# Patient Record
Sex: FEMALE | Race: BLACK OR AFRICAN AMERICAN | Employment: UNEMPLOYED | URBAN - NONMETROPOLITAN AREA
[De-identification: names, ages, dates, MRNs, and addresses within clinical notes are randomized per-mention and may not be internally consistent; named-entity substitution may affect disease eponyms.]

---

## 2020-12-17 ENCOUNTER — HOSPITAL ENCOUNTER (EMERGENCY)
Age: 20
Discharge: HOME | End: 2020-12-17
Attending: EMERGENCY MEDICINE
Payer: MEDICAID

## 2020-12-17 VITALS
DIASTOLIC BLOOD PRESSURE: 51 MMHG | BODY MASS INDEX: 20.83 KG/M2 | RESPIRATION RATE: 20 BRPM | OXYGEN SATURATION: 98 % | HEART RATE: 88 BPM | WEIGHT: 125 LBS | SYSTOLIC BLOOD PRESSURE: 101 MMHG | TEMPERATURE: 99.1 F | HEIGHT: 65 IN

## 2020-12-17 DIAGNOSIS — H18.822 CORNEAL ABRASION DUE TO CONTACT LENS, LEFT: ICD-10-CM

## 2020-12-17 DIAGNOSIS — Z20.2 POSSIBLE EXPOSURE TO STD: Primary | ICD-10-CM

## 2020-12-17 LAB
CLUE CELLS VAG QL WET PREP: NORMAL
T VAGINALIS VAG QL WET PREP: NORMAL

## 2020-12-17 PROCEDURE — 96372 THER/PROPH/DIAG INJ SC/IM: CPT

## 2020-12-17 PROCEDURE — 74011250637 HC RX REV CODE- 250/637: Performed by: EMERGENCY MEDICINE

## 2020-12-17 PROCEDURE — 74011250636 HC RX REV CODE- 250/636: Performed by: EMERGENCY MEDICINE

## 2020-12-17 PROCEDURE — 74011000250 HC RX REV CODE- 250: Performed by: EMERGENCY MEDICINE

## 2020-12-17 PROCEDURE — 99284 EMERGENCY DEPT VISIT MOD MDM: CPT

## 2020-12-17 PROCEDURE — 87491 CHLMYD TRACH DNA AMP PROBE: CPT

## 2020-12-17 PROCEDURE — 87210 SMEAR WET MOUNT SALINE/INK: CPT

## 2020-12-17 RX ORDER — TETRACAINE HYDROCHLORIDE 5 MG/ML
2 SOLUTION OPHTHALMIC
Status: COMPLETED | OUTPATIENT
Start: 2020-12-17 | End: 2020-12-17

## 2020-12-17 RX ORDER — LEVOFLOXACIN 5 MG/ML
1 SOLUTION/ DROPS TOPICAL EVERY 4 HOURS
Qty: 5 ML | Refills: 0 | Status: SHIPPED | OUTPATIENT
Start: 2020-12-17 | End: 2020-12-22

## 2020-12-17 RX ORDER — AZITHROMYCIN 250 MG/1
1000 TABLET, FILM COATED ORAL
Status: COMPLETED | OUTPATIENT
Start: 2020-12-17 | End: 2020-12-17

## 2020-12-17 RX ORDER — FLUCONAZOLE 150 MG/1
150 TABLET ORAL DAILY
Qty: 1 TAB | Refills: 0 | Status: SHIPPED | OUTPATIENT
Start: 2020-12-17 | End: 2020-12-18

## 2020-12-17 RX ORDER — METRONIDAZOLE 250 MG/1
2000 TABLET ORAL
Status: COMPLETED | OUTPATIENT
Start: 2020-12-17 | End: 2020-12-17

## 2020-12-17 RX ADMIN — FLUORESCEIN SODIUM 1 STRIP: 1 STRIP OPHTHALMIC at 04:50

## 2020-12-17 RX ADMIN — TETRACAINE HYDROCHLORIDE 2 DROP: 5 SOLUTION OPHTHALMIC at 04:25

## 2020-12-17 RX ADMIN — METRONIDAZOLE 2000 MG: 250 TABLET ORAL at 05:09

## 2020-12-17 RX ADMIN — AZITHROMYCIN MONOHYDRATE 1000 MG: 250 TABLET ORAL at 05:09

## 2020-12-17 RX ADMIN — LIDOCAINE HYDROCHLORIDE 250 MG: 10 INJECTION, SOLUTION INFILTRATION; PERINEURAL at 05:09

## 2020-12-17 NOTE — ED TRIAGE NOTES
Attempted to removed contact lens, left eye 12/16/2020 during the day, unsuccessful.   C/o Vaginal discomfort, labia major \" feels like cults\"

## 2020-12-17 NOTE — ED NOTES
Discharge to home [facility]. Written and verbal discharge instructions given. x2 prescriptions electronically sent to patients Pharmacy of choice. Allowed time for questions and verbal return demonstration.

## 2020-12-17 NOTE — ED PROVIDER NOTES
EMERGENCY DEPARTMENT HISTORY AND PHYSICAL EXAM      Date: 12/17/2020  Patient Name: Amalia Aguilar    History of Presenting Illness     Chief Complaint   Patient presents with    Contact Lens Irritation     left eye, unable to remove contact    Vaginal Pain     bilateral labia majora       History Provided By: Patient    HPI: Amalia Aguilar, 22 y.o. female with a past medical history significant No significant past medical history presents to the ED with cc of left eye irritation feeling as if she has her contact lens stuck in her eye as well as several days of vaginal irritation and burning with urination as well as clear discharge. No fevers no chills no abdominal pain. Patient states she put her contacts\" yesterday afternoon for picture was having trouble getting them out of both eyes but she was able to remove the one in the right 1 on the left she feels has been stuck in there persistently. There are no other complaints, changes, or physical findings at this time. PCP: Daniel Ware MD    No current facility-administered medications on file prior to encounter. No current outpatient medications on file prior to encounter. Past History     Past Medical History:  No past medical history on file. Past Surgical History:  No past surgical history on file. Family History:  No family history on file. Social History:  Social History     Tobacco Use    Smoking status: Not on file   Substance Use Topics    Alcohol use: Not on file    Drug use: Not on file       Allergies:  No Known Allergies      Review of Systems     Review of Systems   Constitutional: Positive for activity change. HENT: Negative for congestion. Eyes: Positive for pain and redness. Negative for photophobia, discharge and visual disturbance. Gastrointestinal: Negative for abdominal pain, diarrhea, nausea and vomiting. Genitourinary: Positive for dysuria, frequency and vaginal discharge.  Negative for vaginal bleeding and vaginal pain. Physical Exam     Physical Exam  Vitals signs and nursing note reviewed. Exam conducted with a chaperone present. Constitutional:       General: She is not in acute distress. Appearance: Normal appearance. She is not ill-appearing. HENT:      Head: Normocephalic and atraumatic. Right Ear: External ear normal.      Left Ear: External ear normal.      Nose: Nose normal.      Mouth/Throat:      Mouth: Mucous membranes are moist.   Eyes:      General:         Right eye: No discharge. Left eye: No discharge. Extraocular Movements: Extraocular movements intact. Pupils: Pupils are equal, round, and reactive to light. Comments: The left conjunctiva is injected. No obvious foreign body or contact lens identified on exam.  Eyelids were everted and examined. The eye was examined under Woods lamp with fluorescein and tetracaine. There is a small superficial corneal abrasion on the 4:00 to 3 o'clock position on the cornea. Neck:      Musculoskeletal: Normal range of motion and neck supple. Cardiovascular:      Rate and Rhythm: Normal rate and regular rhythm. Pulmonary:      Effort: Pulmonary effort is normal.      Breath sounds: Normal breath sounds. Abdominal:      General: Abdomen is flat. Bowel sounds are normal.      Palpations: Abdomen is soft. Hernia: There is no hernia in the left inguinal area or right inguinal area. Genitourinary:     Exam position: Lithotomy position. Pubic Area: No rash or pubic lice. Tk stage (genital): 5. Labia:         Right: No rash. Left: No rash. Vagina: Vaginal discharge, erythema and tenderness present. No bleeding or lesions. Cervix: No cervical motion tenderness, discharge, erythema or cervical bleeding. Uterus: Normal.       Adnexa: Right adnexa normal and left adnexa normal.      Comments: Thin frothy white vaginal discharge.   Musculoskeletal: Normal range of motion. Lymphadenopathy:      Lower Body: No right inguinal adenopathy. No left inguinal adenopathy. Skin:     General: Skin is warm and dry. Capillary Refill: Capillary refill takes less than 2 seconds. Neurological:      General: No focal deficit present. Mental Status: She is alert. Psychiatric:         Mood and Affect: Mood normal.         Lab and Diagnostic Study Results     Labs -     Recent Results (from the past 12 hour(s))   WET PREP    Collection Time: 12/17/20  4:45 AM    Specimen: Vaginal Specimen   Result Value Ref Range    Clue cells No yeast,trichomonas or clue cells noted      Wet prep No yeast,trichomonas or clue cells noted         Radiologic Studies -   @lastxrresult@  CT Results  (Last 48 hours)    None        CXR Results  (Last 48 hours)    None            Medical Decision Making   - I am the first provider for this patient. - I reviewed the vital signs, available nursing notes, past medical history, past surgical history, family history and social history. - Initial assessment performed. The patients presenting problems have been discussed, and they are in agreement with the care plan formulated and outlined with them. I have encouraged them to ask questions as they arise throughout their visit. Vital Signs-Reviewed the patient's vital signs. Patient Vitals for the past 12 hrs:   Temp Pulse Resp BP SpO2   12/17/20 0407 99.1 °F (37.3 °C) 88 20 (!) 101/51 98 %       Records Reviewed: Nursing Notes        ED Course:          Provider Notes (Medical Decision Making):   26-year-old female presenting to the emergency department with 2 complaints I complaint and vaginal complaint. It appears patient has sustained a corneal abrasion likely in the process of trying to get out her contact out of her left eye.   Wound eye was irrigated with normal saline and anesthetized with tetracaine and stained with fluorescein we did not visualize the contact but there was a small corneal abrasion. Will treat with Levaquin eyedrops given that she wears contacts. In regards to her vaginal discharge potential vaginal yeast infection versus trichomonal infection versus gonorrhea chlamydia. To discussion with patient she wants to be treated will follow up on her labs at home on MyChart. [facility]           Disposition   Disposition:       Discharged    DISCHARGE PLAN:  1. Cannot display discharge medications since this patient is not currently admitted. 2.   Follow-up Information     Follow up With Specialties Details Why Contact Info    [facility] EMERGENCY DEPT Emergency Medicine Go to  As needed, or for any concerns or deteriorations. , if symptoms persist or worsen. [address] MD Pediatric Medicine Schedule an appointment as soon as possible for a visit  As needed, For followup and recheck of todays symptoms [address]  [phone]          3. Return to ED if worse   4. Discharge Medication List as of 12/17/2020  5:54 AM      START taking these medications    Details   levoFLOXacin (QUIXIN) 0.5 % ophthalmic solution Administer 1 Drop to left eye every four (4) hours for 5 days. use in affected eye(s), Normal, Disp-5 mL, R-0      fluconazole (DIFLUCAN) 150 mg tablet Take 1 Tab by mouth daily for 1 day. FDA advises cautious prescribing of oral fluconazole in pregnancy. , Normal, Disp-1 Tab, R-0               Diagnosis     Clinical Impression:   1. Possible exposure to STD    2. Corneal abrasion due to contact lens, left        Attestations:    Christian Pimentel MD    Please note that this dictation was completed with Glide, the computer voice recognition software. Quite often unanticipated grammatical, syntax, homophones, and other interpretive errors are inadvertently transcribed by the computer software. Please disregard these errors.   Please excuse any errors that have escaped final proofreading. Thank you.

## 2020-12-21 LAB
C TRACH RRNA SPEC QL NAA+PROBE: NEGATIVE
N GONORRHOEA RRNA SPEC QL NAA+PROBE: NEGATIVE
SPECIMEN SOURCE: NORMAL

## 2021-01-09 ENCOUNTER — APPOINTMENT (OUTPATIENT)
Dept: CT IMAGING | Age: 21
End: 2021-01-09
Attending: FAMILY MEDICINE
Payer: MEDICAID

## 2021-01-09 ENCOUNTER — HOSPITAL ENCOUNTER (EMERGENCY)
Age: 21
Discharge: ANOTHER HOSPITAL | End: 2021-01-09
Attending: FAMILY MEDICINE | Admitting: FAMILY MEDICINE
Payer: MEDICAID

## 2021-01-09 VITALS
TEMPERATURE: 98.2 F | SYSTOLIC BLOOD PRESSURE: 113 MMHG | HEIGHT: 65 IN | DIASTOLIC BLOOD PRESSURE: 61 MMHG | OXYGEN SATURATION: 99 % | RESPIRATION RATE: 16 BRPM | HEART RATE: 83 BPM | WEIGHT: 125 LBS | BODY MASS INDEX: 20.83 KG/M2

## 2021-01-09 DIAGNOSIS — N83.201 CYST OF RIGHT OVARY: ICD-10-CM

## 2021-01-09 DIAGNOSIS — R10.2 PELVIC PAIN: Primary | ICD-10-CM

## 2021-01-09 LAB
ANION GAP SERPL CALC-SCNC: 7 MMOL/L
BASOPHILS # BLD: 0 K/UL
BASOPHILS NFR BLD: 0 %
BUN SERPL-MCNC: 7 MG/DL (ref 9–21)
BUN/CREAT SERPL: 14
CA-I BLD-MCNC: 9.4 MG/DL (ref 8.5–10.5)
CHLORIDE SERPL-SCNC: 106 MMOL/L (ref 94–111)
CO2 SERPL-SCNC: 23 MMOL/L (ref 21–33)
CREAT SERPL-MCNC: 0.5 MG/DL (ref 0.7–1.2)
EOSINOPHIL # BLD: 0 K/UL
EOSINOPHIL NFR BLD: 0 %
ERYTHROCYTE [DISTWIDTH] IN BLOOD BY AUTOMATED COUNT: 19 % (ref 11.6–14.5)
GLUCOSE SERPL-MCNC: 90 MG/DL (ref 70–110)
HCT VFR BLD AUTO: 23.4 % (ref 35–45)
HGB BLD-MCNC: 8.2 G/DL (ref 12–16)
IMM GRANULOCYTES # BLD AUTO: 0 K/UL
IMM GRANULOCYTES NFR BLD AUTO: 0 %
LYMPHOCYTES # BLD: 3.2 K/UL
LYMPHOCYTES NFR BLD: 23 %
MCH RBC QN AUTO: 34.2 PG (ref 24–34)
MCHC RBC AUTO-ENTMCNC: 35 G/DL (ref 31–37)
MCV RBC AUTO: 97.5 FL (ref 74–97)
MONOCYTES # BLD: 1 K/UL
MONOCYTES NFR BLD: 7 %
NEUTS BAND NFR BLD MANUAL: 1 %
NEUTS SEG # BLD: 9.7 K/UL
NEUTS SEG NFR BLD: 69 %
NRBC # BLD: 0.28 K/UL
NRBC BLD MANUAL-RTO: 2 PER 100 WBC
PLATELET # BLD AUTO: 318 K/UL (ref 135–420)
PLATELET COMMENTS,PCOM: ABNORMAL
PMV BLD AUTO: 9.8 FL (ref 9.2–11.8)
POTASSIUM SERPL-SCNC: 4.5 MMOL/L (ref 3.2–5.1)
RBC # BLD AUTO: 2.4 M/UL (ref 4.2–5.3)
RBC MORPH BLD: ABNORMAL
SODIUM SERPL-SCNC: 136 MMOL/L (ref 135–145)
WBC # BLD AUTO: 13.8 K/UL (ref 4.6–13.2)

## 2021-01-09 PROCEDURE — 96374 THER/PROPH/DIAG INJ IV PUSH: CPT

## 2021-01-09 PROCEDURE — 99284 EMERGENCY DEPT VISIT MOD MDM: CPT

## 2021-01-09 PROCEDURE — 80048 BASIC METABOLIC PNL TOTAL CA: CPT

## 2021-01-09 PROCEDURE — 74176 CT ABD & PELVIS W/O CONTRAST: CPT

## 2021-01-09 PROCEDURE — 96375 TX/PRO/DX INJ NEW DRUG ADDON: CPT

## 2021-01-09 PROCEDURE — 85025 COMPLETE CBC W/AUTO DIFF WBC: CPT

## 2021-01-09 PROCEDURE — 74011250636 HC RX REV CODE- 250/636: Performed by: FAMILY MEDICINE

## 2021-01-09 PROCEDURE — 36415 COLL VENOUS BLD VENIPUNCTURE: CPT

## 2021-01-09 RX ORDER — HYDROXYUREA 1000 MG/1
1750 TABLET, FILM COATED ORAL DAILY
COMMUNITY
End: 2022-01-01

## 2021-01-09 RX ORDER — KETOROLAC TROMETHAMINE 30 MG/ML
30 INJECTION, SOLUTION INTRAMUSCULAR; INTRAVENOUS
Status: COMPLETED | OUTPATIENT
Start: 2021-01-09 | End: 2021-01-09

## 2021-01-09 RX ORDER — VOXELOTOR 500 MG/1
1500 TABLET, FILM COATED ORAL DAILY
COMMUNITY

## 2021-01-09 RX ORDER — ONDANSETRON 2 MG/ML
4 INJECTION INTRAMUSCULAR; INTRAVENOUS ONCE
Status: COMPLETED | OUTPATIENT
Start: 2021-01-09 | End: 2021-01-09

## 2021-01-09 RX ORDER — FOLIC ACID 1 MG/1
TABLET ORAL DAILY
COMMUNITY

## 2021-01-09 RX ADMIN — ONDANSETRON 4 MG: 2 INJECTION INTRAMUSCULAR; INTRAVENOUS at 17:45

## 2021-01-09 RX ADMIN — KETOROLAC TROMETHAMINE 30 MG: 30 INJECTION, SOLUTION INTRAMUSCULAR at 17:50

## 2021-01-09 RX ADMIN — SODIUM CHLORIDE 1000 ML: 9 INJECTION, SOLUTION INTRAVENOUS at 17:45

## 2021-01-09 NOTE — ED TRIAGE NOTES
Pt started with right side and suprapubic pain a couple of hours ago, states it hurts to pee.  Pt states she has sickle cell and she had a routine appt yesterday, states they checked blood and urine yesterday and everything was ok.  Pt states they were supposed to send her oxycodone in, but it was not called in to the pharmacy

## 2021-01-09 NOTE — ED PROVIDER NOTES
EMERGENCY DEPARTMENT HISTORY AND PHYSICAL EXAM      Date: 1/9/2021  Patient Name: Blair Bryan    History of Presenting Illness     Chief Complaint   Patient presents with    Urinary Pain       History Provided By: Patient    HPI: Blair Bryan, 23 y.o. female with a past medical history of sickle cell anemia presents to the ED with complaints of lower abdominal pain. Pt reports sudden onset of RLQ pain approx 1.5 hrs ago. Pain is described as sharp and has remained constant since onset. Pain radiated into the low back and is rated 8/10. Pt states she was hot, dizzy and diaphoretic at time of onset, but states these sx have since resolved. She does note burning w/urination, but denies fever, chills or any other sx. Pt had a routine hematology appt yesterday; blood work and urine were negative. LMP within the past month. There are no other complaints, changes, or physical findings at this time. PCP: Angela Webster MD    No current facility-administered medications on file prior to encounter. Current Outpatient Medications on File Prior to Encounter   Medication Sig Dispense Refill    folic acid (FOLVITE) 1 mg tablet Take  by mouth daily.  hydroxyurea, sickle cell, (Siklos) 1,000 mg tab Take  by mouth.  voxelotor (Oxbryta) 500 mg tab Take  by mouth. Past History     Past Medical History:  Past Medical History:   Diagnosis Date    Sickle cell anemia (HCC)        Past Surgical History:  Past Surgical History:   Procedure Laterality Date    HX APPENDECTOMY      HX CHOLECYSTECTOMY      HX TONSIL AND ADENOIDECTOMY         Family History:  History reviewed. No pertinent family history.     Social History:  Social History     Tobacco Use    Smoking status: Never Smoker    Smokeless tobacco: Never Used   Substance Use Topics    Alcohol use: Not on file    Drug use: Not on file       Allergies:  No Known Allergies      Review of Systems   Review of Systems Constitutional: Negative for chills, diaphoresis, fatigue and fever. HENT: Negative for congestion, rhinorrhea and sore throat. Respiratory: Negative for cough, shortness of breath and wheezing. Cardiovascular: Negative for chest pain and palpitations. Gastrointestinal: Positive for abdominal pain (RLQ). Negative for diarrhea, nausea and vomiting. Genitourinary: Positive for dysuria and pelvic pain. Negative for menstrual problem and vaginal bleeding. Musculoskeletal: Positive for back pain. Negative for arthralgias and myalgias. Neurological: Negative for dizziness, light-headedness and headaches. Physical Exam   Physical Exam  Vitals signs and nursing note reviewed. Constitutional:       General: She is awake. Appearance: Normal appearance. She is well-developed and normal weight. She is not ill-appearing, toxic-appearing or diaphoretic. Interventions: Face mask in place. Comments: In moderate pain/distress. HENT:      Head: Normocephalic and atraumatic. Eyes:      Extraocular Movements: Extraocular movements intact. Pupils: Pupils are equal, round, and reactive to light. Neck:      Musculoskeletal: Normal range of motion and neck supple. Cardiovascular:      Rate and Rhythm: Normal rate and regular rhythm. Pulses: Normal pulses. Heart sounds: Normal heart sounds. Pulmonary:      Effort: Pulmonary effort is normal.      Breath sounds: Normal breath sounds. Abdominal:      General: Abdomen is flat. Palpations: Abdomen is soft. Tenderness: There is abdominal tenderness in the right upper quadrant. Genitourinary:     Comments: Tenderness over right adnexa. Skin:     General: Skin is warm and dry. Neurological:      Mental Status: She is alert and oriented to person, place, and time. GCS: GCS eye subscore is 4. GCS verbal subscore is 5. GCS motor subscore is 6.    Psychiatric:         Mood and Affect: Mood and affect normal. Behavior: Behavior normal. Behavior is cooperative. Thought Content: Thought content normal.         Diagnostic Study Results     Labs -     Recent Results (from the past 12 hour(s))   CBC WITH AUTOMATED DIFF    Collection Time: 01/09/21  4:45 PM   Result Value Ref Range    WBC 13.8 (H) 4.6 - 13.2 K/uL    RBC 2.40 (L) 4.20 - 5.30 M/uL    HGB 8.2 (L) 12.0 - 16.0 g/dL    HCT 23.4 (L) 35.0 - 45.0 %    MCV 97.5 (H) 74.0 - 97.0 FL    MCH 34.2 (H) 24.0 - 34.0 PG    MCHC 35.0 31.0 - 37.0 g/dL    RDW 19.0 (H) 11.6 - 14.5 %    PLATELET 341 581 - 145 K/uL    MPV 9.8 9.2 - 11.8 FL    NEUTROPHILS 69 %    BAND NEUTROPHILS 1 %    LYMPHOCYTES 23 %    MONOCYTES 7 %    EOSINOPHILS 0 %    BASOPHILS 0 %    NRBC 2.0  WBC    IMMATURE GRANULOCYTES 0 %    ABS. NEUTROPHILS 9.7 K/UL    ABS. LYMPHOCYTES 3.2 K/UL    ABS. MONOCYTES 1.0 K/UL    ABS. EOSINOPHILS 0.0 K/UL    ABS. BASOPHILS 0.0 K/UL    ABSOLUTE NRBC 0.28 K/uL    ABS. IMM. GRANS. 0.0 K/UL    PLATELET COMMENTS Clumped Platelets      RBC COMMENTS Anisocytosis  4+        RBC COMMENTS Sickle cells  2+        RBC COMMENTS Target Cells  1+        RBC COMMENTS Polychromasia  2+       METABOLIC PANEL, BASIC    Collection Time: 01/09/21  4:45 PM   Result Value Ref Range    Sodium 136 135 - 145 mmol/L    Potassium 4.5 3.2 - 5.1 mmol/L    Chloride 106 94 - 111 mmol/L    CO2 23 21 - 33 mmol/L    Anion gap 7 mmol/L    Glucose 90 70 - 110 mg/dL    BUN 7 (L) 9 - 21 mg/dL    Creatinine 0.50 (L) 0.70 - 1.20 mg/dL    BUN/Creatinine ratio 14      GFR est AA >60 ml/min/1.73m2    GFR est non-AA >60 ml/min/1.73m2    Calcium 9.4 8.5 - 10.5 mg/dL       Radiologic Studies -   CT ABD PELV WO CONT   Final Result   IMPRESSION:      There is a 2.7 cm cystic structure in the right adnexa and there is high density   slightly complex fluid in the cul-de-sac concerning for a ruptured adnexal cyst.   Advise pelvic ultrasound for further evaluation to exclude torsion or other   adnexal pathology. 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 Atrophic calcified spleen suggesting prior infarction. Findings in keeping with known history of sickle cell disease. CT Results  (Last 48 hours)               01/09/21 1657  CT ABD PELV WO CONT Final result    Impression:  IMPRESSION:       There is a 2.7 cm cystic structure in the right adnexa and there is high density   slightly complex fluid in the cul-de-sac concerning for a ruptured adnexal cyst.   Advise pelvic ultrasound for further evaluation to exclude torsion or other   adnexal pathology. 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 Atrophic calcified spleen suggesting prior infarction. Findings in keeping with known history of sickle cell disease. Narrative:  EXAM: CT of the abdomen and pelvis       INDICATION: Right lower quadrant pain       COMPARISON: None. TECHNIQUE: Axial CT imaging of the abdomen and pelvis was performed without   intravenous contrast. Multiplanar reformats were generated. One or more dose   reduction techniques were used on this CT: automated exposure control,   adjustment of the mAs and/or kVp according to patient size, and iterative   reconstruction techniques. The specific techniques used on this CT exam have   been documented in the patient's electronic medical record. Digital Imaging and   Communications in Medicine (DICOM) format image data are available to   nonaffiliated external healthcare facilities or entities on a secure, media   free, reciprocally searchable basis with patient authorization for at least a   12-month period after this study. _______________       FINDINGS:       LOWER CHEST: There is bandlike scarring in both lung bases. LIVER, BILIARY: Liver is normal. No biliary dilation. Cholecystectomy       PANCREAS: Normal.       SPLEEN: Atrophic densely calcified spleen suggesting prior splenic infarction. ADRENALS: Normal.       KIDNEYS: Slightly prominent left extrarenal pelvis present otherwise   unremarkable. VASCULATURE: Unremarkable       LYMPH NODES: No enlarged lymph nodes. GASTROINTESTINAL TRACT: There is mild colonic wall thickening of the   rectosigmoid colon which may reflect under distention or mild colitis. Appendectomy. There is no bowel obstruction. PELVIC ORGANS: Uterus is unremarkable. Urinary bladder is within normal limits. There is a 2.7 cm right adnexal cystic structure. Adjacent free fluid is seen in   the right adnexa. There is some high density material in the cul-de-sac may   represent blood products. Findings concerning for ruptured cyst. Advise pelvic   ultrasound for further evaluation. BONES: There is advanced avascular necrosis left femoral head with collapse. Subtle early avascular necrosis seen in the right femoral head without collapse. OTHER: None.       _______________               CXR Results  (Last 48 hours)    None            Medical Decision Making   I am the first provider for this patient. I reviewed the vital signs, available nursing notes, past medical history, past surgical history, family history and social history. Vital Signs-Reviewed the patient's vital signs. Patient Vitals for the past 12 hrs:   Temp Pulse Resp BP SpO2   01/09/21 1628 98.2 °F (36.8 °C) 88 18 (!) 107/48 98 %       Records Reviewed: Nursing Notes and Old Medical Records    The patient presents with abdominal pain with a differential diagnosis of UTI versus kidney stone. ED Course:   Initial assessment performed. The patients presenting problems have been discussed, and they are in agreement with the care plan formulated and outlined with them.   I have encouraged them to ask questions as they arise throughout their visit.    Provider Notes (Medical Decision Making):   8652  Patient is a 23-year-old female presented with acute onset of right lower quadrant pain. She has previously had an appendectomy. I was concerned about kidney stones. CT was done and showed a cyst on the ovary that looked like it ruptured. However patient was still having a lot of pain. Cannot rule out torsion. Do not have ultrasound capabilities this evening. Will transfer ER to ER at [facility] for pelvic ultrasound. After JEFF Suresh is the accepting physician. Disposition     Transfer    Consult - Dr. Nazia Kenyon -ED physician at [facility].  Patient was discussed and patient is being transferred for a pelvic ultrasound. Diagnosis     Clinical Impression:   1. Pelvic pain    2. Cyst of right ovary        Attestations:    Fabienne Murphy MD    By signing my name below, Vin Coello, attest that this documentation has been prepared under the direction and in presence of Dr. Maki Chang on 01/09/21. Electronically signed: Rehana Lindo, 01/09/21, 4:42 PM    Please note that this dictation was completed with [employer], the [employer] voice recognition software. Quite often unanticipated grammatical, syntax, homophones, and other interpretive errors are inadvertently transcribed by the computer software. Please disregard these errors. Please excuse any errors that have escaped final proofreading. Thank you.

## 2021-01-10 NOTE — ED NOTES
TRANSFER - OUT REPORT:    Verbal report given to Radha Vazquez RN(name) on Fede Alvarez  being transferred to [facility]) for routine progression of care       Report consisted of patients Situation, Background, Assessment and   Recommendations(SBAR). Information from the following report(s) ED Summary was reviewed with the receiving nurse. Lines:       Opportunity for questions and clarification was provided.       Patient transported with:   Monitor via [email or web address]

## 2021-06-12 ENCOUNTER — HOSPITAL ENCOUNTER (EMERGENCY)
Age: 21
Discharge: HOME | End: 2021-06-12
Attending: FAMILY MEDICINE
Payer: MEDICAID

## 2021-06-12 VITALS
WEIGHT: 122 LBS | HEART RATE: 76 BPM | TEMPERATURE: 98.9 F | BODY MASS INDEX: 19.61 KG/M2 | SYSTOLIC BLOOD PRESSURE: 123 MMHG | HEIGHT: 66 IN | DIASTOLIC BLOOD PRESSURE: 47 MMHG | OXYGEN SATURATION: 99 % | RESPIRATION RATE: 16 BRPM

## 2021-06-12 DIAGNOSIS — M54.50 ACUTE RIGHT-SIDED LOW BACK PAIN WITHOUT SCIATICA: Primary | ICD-10-CM

## 2021-06-12 PROCEDURE — 99283 EMERGENCY DEPT VISIT LOW MDM: CPT

## 2021-06-12 PROCEDURE — 74011250637 HC RX REV CODE- 250/637: Performed by: FAMILY MEDICINE

## 2021-06-12 RX ORDER — POLYETHYLENE GLYCOL 3350 17 G/17G
17 POWDER, FOR SOLUTION ORAL DAILY
COMMUNITY
End: 2021-01-01

## 2021-06-12 RX ORDER — GABAPENTIN 300 MG/1
300 CAPSULE ORAL
COMMUNITY
Start: 2021-02-15 | End: 2021-01-01

## 2021-06-12 RX ORDER — OMEPRAZOLE 20 MG/1
20 CAPSULE, DELAYED RELEASE ORAL DAILY
COMMUNITY
End: 2021-01-01

## 2021-06-12 RX ORDER — KETOROLAC TROMETHAMINE 10 MG/1
10 TABLET, FILM COATED ORAL
Qty: 10 TABLET | Refills: 0 | Status: SHIPPED | OUTPATIENT
Start: 2021-06-12 | End: 2021-01-01

## 2021-06-12 RX ORDER — CYCLOBENZAPRINE HCL 10 MG
10 TABLET ORAL
Qty: 20 TABLET | Refills: 0 | Status: SHIPPED | OUTPATIENT
Start: 2021-06-12 | End: 2021-01-01

## 2021-06-12 RX ORDER — KETOROLAC TROMETHAMINE 10 MG/1
10 TABLET, FILM COATED ORAL ONCE
Status: COMPLETED | OUTPATIENT
Start: 2021-06-12 | End: 2021-06-12

## 2021-06-12 RX ORDER — LIDOCAINE 50 MG/G
1 PATCH TOPICAL AS NEEDED
COMMUNITY
End: 2021-01-01

## 2021-06-12 RX ORDER — CYCLOBENZAPRINE HCL 10 MG
10 TABLET ORAL
Status: COMPLETED | OUTPATIENT
Start: 2021-06-12 | End: 2021-06-12

## 2021-06-12 RX ORDER — IBUPROFEN 200 MG
200 TABLET ORAL
COMMUNITY
End: 2022-01-01

## 2021-06-12 RX ADMIN — KETOROLAC TROMETHAMINE 10 MG: 10 TABLET, FILM COATED ORAL at 15:03

## 2021-06-12 RX ADMIN — CYCLOBENZAPRINE 10 MG: 10 TABLET, FILM COATED ORAL at 15:03

## 2021-06-12 NOTE — LETTER
[facility] EMERGENCY DEPT 
[facility] [id number] 
[phone] Work/School Note Date: 6/12/2021 To Whom It May concern: 
 
Waylon Wood was seen and treated today in the emergency room by the following provider(s): 
Attending Provider: Minerva Demarco MD. Waylon Wood is excused from work/school on 06/12/21 and 06/13/21. She is medically clear to return to work/school on 6/14/2021.   
 
 
Sincerely, 
 
 
 
 
Louis Lao RN

## 2021-06-12 NOTE — ED TRIAGE NOTES
Pt reports lower back pain since 1000 this morning, reports she is a sickle cell pt and has not had a flare up in a while and no longer has medication at home for pain control. Universal Safety Interventions

## 2021-06-12 NOTE — ED PROVIDER NOTES
EMERGENCY DEPARTMENT HISTORY AND PHYSICAL EXAM      Date: 6/12/2021  Patient Name: Carmelita Adams    History of Presenting Illness     Chief Complaint   Patient presents with    Back Pain     lower back       History Provided By: Patient    HPI: Carmelita Adams, 23 y.o. female with a past medical history significant Sickle cell disease presents to the ED with cc of low back pain. Patient states she was at work today and started having low back pain. She thinks it is related to the weather. She has a history of sickle cell disease. She states this is not a pain crisis. She took some ibuprofen without relief. She denies any radiation of pain. She denies any numbness or tingling. There are no other complaints, changes, or physical findings at this time. PCP: Jamar Serrato MD    No current facility-administered medications on file prior to encounter. Current Outpatient Medications on File Prior to Encounter   Medication Sig Dispense Refill    gabapentin (NEURONTIN) 300 mg capsule Take 300 mg by mouth three (3) times daily as needed.  polyethylene glycol (MIRALAX) 17 gram/dose powder Take 17 g by mouth daily.  omeprazole (PRILOSEC) 20 mg capsule Take 20 mg by mouth daily.  ibuprofen (MOTRIN) 200 mg tablet Take 200 mg by mouth every six (6) hours as needed.  lidocaine (LIDODERM) 5 % Apply 1 Patch to affected area as needed.  folic acid (FOLVITE) 1 mg tablet Take  by mouth daily.  hydroxyurea, sickle cell, (Siklos) 1,000 mg tab Take  by mouth.  voxelotor (Oxbryta) 500 mg tab Take  by mouth. Past History     Past Medical History:  Past Medical History:   Diagnosis Date    Sickle cell anemia (HCC)        Past Surgical History:  Past Surgical History:   Procedure Laterality Date    HX APPENDECTOMY      HX CHOLECYSTECTOMY      HX TONSIL AND ADENOIDECTOMY         Family History:  History reviewed. No pertinent family history.     Social History:  Social History     Tobacco Use    Smoking status: Never Smoker    Smokeless tobacco: Never Used   Vaping Use    Vaping Use: Never used   Substance Use Topics    Alcohol use: Never    Drug use: Never       Allergies:  No Known Allergies      Review of Systems     Review of Systems   Musculoskeletal: Positive for back pain. Negative for gait problem, myalgias, neck pain and neck stiffness. Neurological: Negative for weakness and numbness. Physical Exam     Physical Exam  Vitals and nursing note reviewed. Constitutional:       General: She is awake. She is not in acute distress. Appearance: Normal appearance. She is well-developed and normal weight. She is not ill-appearing, toxic-appearing or diaphoretic. Interventions: Face mask in place. HENT:      Head: Normocephalic and atraumatic. Eyes:      Conjunctiva/sclera: Conjunctivae normal.      Pupils: Pupils are equal, round, and reactive to light. Cardiovascular:      Rate and Rhythm: Normal rate and regular rhythm. Pulses: Normal pulses. Heart sounds: Normal heart sounds. Pulmonary:      Effort: Pulmonary effort is normal.      Breath sounds: Normal breath sounds. Abdominal:      General: Abdomen is flat. Palpations: Abdomen is soft. Tenderness: There is no abdominal tenderness. Musculoskeletal:        Arms:       Cervical back: Normal range of motion and neck supple. Comments: Tenderness and spasm   Skin:     General: Skin is warm and dry. Neurological:      Mental Status: She is alert and oriented to person, place, and time. GCS: GCS eye subscore is 4. GCS verbal subscore is 5. GCS motor subscore is 6. Psychiatric:         Mood and Affect: Mood and affect normal.         Behavior: Behavior normal. Behavior is cooperative. Thought Content:  Thought content normal.         Lab and Diagnostic Study Results     Labs -   No results found for this or any previous visit (from the past 12 hour(s)). Radiologic Studies -   @lastxrresult@  CT Results  (Last 48 hours)    None        CXR Results  (Last 48 hours)    None            Medical Decision Making   - I am the first provider for this patient. - I reviewed the vital signs, available nursing notes, past medical history, past surgical history, family history and social history. - Initial assessment performed. The patients presenting problems have been discussed, and they are in agreement with the care plan formulated and outlined with them. I have encouraged them to ask questions as they arise throughout their visit. Vital Signs-Reviewed the patient's vital signs. Patient Vitals for the past 12 hrs:   Temp Pulse Resp BP SpO2   06/12/21 1409 98.9 °F (37.2 °C) 76 16 (!) 123/47 99 %       Records Reviewed: Nursing Notes          ED Course:          Provider Notes (Medical Decision Making): MDM       Procedures   Medical Decision Makingedical Decision Making  Performed by: Beverley Marie MD  PROCEDURES:  Procedures       Disposition   Disposition:     Discharged    DISCHARGE PLAN:  1. Current Discharge Medication List      CONTINUE these medications which have NOT CHANGED    Details   gabapentin (NEURONTIN) 300 mg capsule Take 300 mg by mouth three (3) times daily as needed. polyethylene glycol (MIRALAX) 17 gram/dose powder Take 17 g by mouth daily. omeprazole (PRILOSEC) 20 mg capsule Take 20 mg by mouth daily. ibuprofen (MOTRIN) 200 mg tablet Take 200 mg by mouth every six (6) hours as needed. lidocaine (LIDODERM) 5 % Apply 1 Patch to affected area as needed. folic acid (FOLVITE) 1 mg tablet Take  by mouth daily. hydroxyurea, sickle cell, (Siklos) 1,000 mg tab Take  by mouth. voxelotor (Oxbryta) 500 mg tab Take  by mouth.            2.   Follow-up Information     Follow up With Specialties Details Why Contact Info    Jamar Serrato MD Pediatric Medicine  As needed Geovany Gonzalez [address] [place] [address] [address] [place] [address]  [phone]          3. Return to ED if worse   4. Current Discharge Medication List            Diagnosis     Clinical Impression:   1. Acute right-sided low back pain without sciatica        Attestations:    Linda Funes MD    Please note that this dictation was completed with [employer], the computer voice recognition software. Quite often unanticipated grammatical, syntax, homophones, and other interpretive errors are inadvertently transcribed by the computer software. Please disregard these errors. Please excuse any errors that have escaped final proofreading. Thank you.

## 2021-06-12 NOTE — LETTER
[facility] EMERG[facility]Y DEPT 
[facility] [place] [id number] 
[phone] Work/School Note Date: 6/12/2021 To Whom It May concern: 
 
 
Aydee Navarrete was seen and treated today in the emergency room by the following provider(s): 
Attending Provider: Reyes Ace, MD. Aydee Navarrete is excused from work/school on 06/12/21. She is clear to return to work/school on 06/13/21.    
 
 
Sincerely, 
 
 
 
 
Jefferson Grimm RN

## 2021-08-09 PROBLEM — D57.00 SICKLE CELL PAIN CRISIS (HCC): Status: ACTIVE | Noted: 2021-01-01

## 2021-08-09 PROBLEM — R07.9 ACUTE CHEST PAIN: Status: ACTIVE | Noted: 2021-01-01

## 2021-11-13 PROBLEM — R07.9 CHEST PAIN: Status: ACTIVE | Noted: 2021-01-01

## 2021-11-13 PROBLEM — D57.00 SICKLE CELL CRISIS (HCC): Status: ACTIVE | Noted: 2021-01-01

## 2022-06-08 PROBLEM — D57.01 ACUTE CHEST SYNDROME (HCC): Status: ACTIVE | Noted: 2022-01-01

## 2022-06-13 PROBLEM — J96.01 ACUTE RESPIRATORY FAILURE WITH HYPOXIA (HCC): Status: ACTIVE | Noted: 2022-01-01

## 2022-06-13 PROBLEM — R14.0 ABDOMINAL DISTENTION: Status: ACTIVE | Noted: 2022-01-01

## 2022-06-13 PROBLEM — A41.9 SEPSIS (HCC): Status: ACTIVE | Noted: 2022-01-01
